# Patient Record
Sex: FEMALE | Race: OTHER | NOT HISPANIC OR LATINO
[De-identification: names, ages, dates, MRNs, and addresses within clinical notes are randomized per-mention and may not be internally consistent; named-entity substitution may affect disease eponyms.]

---

## 2017-01-11 ENCOUNTER — APPOINTMENT (OUTPATIENT)
Dept: INTERNAL MEDICINE | Facility: CLINIC | Age: 52
End: 2017-01-11

## 2017-01-11 VITALS
RESPIRATION RATE: 16 BRPM | SYSTOLIC BLOOD PRESSURE: 120 MMHG | DIASTOLIC BLOOD PRESSURE: 76 MMHG | HEIGHT: 68 IN | TEMPERATURE: 98.2 F | HEART RATE: 92 BPM | OXYGEN SATURATION: 98 % | WEIGHT: 126 LBS | BODY MASS INDEX: 19.1 KG/M2

## 2017-01-11 DIAGNOSIS — Z87.09 PERSONAL HISTORY OF OTHER DISEASES OF THE RESPIRATORY SYSTEM: ICD-10-CM

## 2017-01-11 DIAGNOSIS — R06.2 WHEEZING: ICD-10-CM

## 2017-02-21 ENCOUNTER — APPOINTMENT (OUTPATIENT)
Dept: INTERNAL MEDICINE | Facility: CLINIC | Age: 52
End: 2017-02-21

## 2017-03-22 ENCOUNTER — RESULT REVIEW (OUTPATIENT)
Age: 52
End: 2017-03-22

## 2017-04-18 ENCOUNTER — APPOINTMENT (OUTPATIENT)
Dept: INTERNAL MEDICINE | Facility: CLINIC | Age: 52
End: 2017-04-18

## 2017-04-18 VITALS
HEART RATE: 88 BPM | SYSTOLIC BLOOD PRESSURE: 115 MMHG | DIASTOLIC BLOOD PRESSURE: 78 MMHG | TEMPERATURE: 98 F | WEIGHT: 130 LBS | OXYGEN SATURATION: 98 % | BODY MASS INDEX: 19.7 KG/M2 | RESPIRATION RATE: 17 BRPM | HEIGHT: 68 IN

## 2017-04-18 DIAGNOSIS — Z00.00 ENCOUNTER FOR GENERAL ADULT MEDICAL EXAMINATION W/OUT ABNORMAL FINDINGS: ICD-10-CM

## 2017-04-18 DIAGNOSIS — M47.892 OTHER SPONDYLOSIS, CERVICAL REGION: ICD-10-CM

## 2017-04-18 DIAGNOSIS — H50.9 UNSPECIFIED STRABISMUS: ICD-10-CM

## 2017-04-18 RX ORDER — BUDESONIDE 90 UG/1
90 AEROSOL, POWDER RESPIRATORY (INHALATION) TWICE DAILY
Qty: 1 | Refills: 3 | Status: DISCONTINUED | COMMUNITY
Start: 2017-01-11 | End: 2017-04-18

## 2017-04-18 RX ORDER — AZITHROMYCIN 250 MG/1
250 TABLET, FILM COATED ORAL
Qty: 6 | Refills: 0 | Status: DISCONTINUED | COMMUNITY
Start: 2017-01-11 | End: 2017-04-18

## 2017-04-19 DIAGNOSIS — G43.909 MIGRAINE, UNSPECIFIED, NOT INTRACTABLE, W/OUT STATUS MIGRAINOSUS: ICD-10-CM

## 2018-02-16 ENCOUNTER — TRANSCRIPTION ENCOUNTER (OUTPATIENT)
Age: 53
End: 2018-02-16

## 2018-02-20 PROBLEM — G43.909 MIGRAINES: Status: ACTIVE | Noted: 2018-02-20

## 2018-12-04 ENCOUNTER — APPOINTMENT (OUTPATIENT)
Dept: PULMONOLOGY | Facility: CLINIC | Age: 53
End: 2018-12-04

## 2018-12-20 ENCOUNTER — TRANSCRIPTION ENCOUNTER (OUTPATIENT)
Age: 53
End: 2018-12-20

## 2018-12-21 ENCOUNTER — OUTPATIENT (OUTPATIENT)
Dept: OUTPATIENT SERVICES | Facility: HOSPITAL | Age: 53
LOS: 1 days | End: 2018-12-21
Payer: COMMERCIAL

## 2018-12-21 VITALS
HEART RATE: 77 BPM | SYSTOLIC BLOOD PRESSURE: 118 MMHG | RESPIRATION RATE: 17 BRPM | OXYGEN SATURATION: 100 % | DIASTOLIC BLOOD PRESSURE: 78 MMHG

## 2018-12-21 VITALS
HEART RATE: 79 BPM | TEMPERATURE: 98 F | SYSTOLIC BLOOD PRESSURE: 95 MMHG | RESPIRATION RATE: 16 BRPM | WEIGHT: 131.84 LBS | HEIGHT: 68 IN | OXYGEN SATURATION: 100 % | DIASTOLIC BLOOD PRESSURE: 54 MMHG

## 2018-12-21 DIAGNOSIS — H50.331 INTERMITTENT MONOCULAR EXOTROPIA, RIGHT EYE: ICD-10-CM

## 2018-12-21 DIAGNOSIS — Z98.891 HISTORY OF UTERINE SCAR FROM PREVIOUS SURGERY: Chronic | ICD-10-CM

## 2018-12-21 DIAGNOSIS — H50.332 INTERMITTENT MONOCULAR EXOTROPIA, LEFT EYE: ICD-10-CM

## 2018-12-21 LAB — HCG UR QL: NEGATIVE — SIGNIFICANT CHANGE UP

## 2018-12-21 PROCEDURE — 67311 REVISE EYE MUSCLE: CPT | Mod: 50

## 2018-12-21 PROCEDURE — 81025 URINE PREGNANCY TEST: CPT

## 2018-12-21 NOTE — ASU DISCHARGE PLAN (ADULT/PEDIATRIC). - SPECIAL INSTRUCTIONS
resume usual diet at home, as tolerated  no swimming or playing in dirt for 2 weeks  clean bloody tears with moist cloth or paper towel as needed  resume spectacle use the day after surgery, if glasses are worn

## 2020-12-15 PROBLEM — Z87.09 HISTORY OF ACUTE BRONCHITIS: Status: RESOLVED | Noted: 2017-01-11 | Resolved: 2020-12-15

## 2025-04-10 ENCOUNTER — NON-APPOINTMENT (OUTPATIENT)
Age: 60
End: 2025-04-10